# Patient Record
Sex: FEMALE | Race: WHITE
[De-identification: names, ages, dates, MRNs, and addresses within clinical notes are randomized per-mention and may not be internally consistent; named-entity substitution may affect disease eponyms.]

---

## 2017-07-11 ENCOUNTER — APPOINTMENT (OUTPATIENT)
Dept: DERMATOLOGY | Facility: CLINIC | Age: 67
End: 2017-07-11

## 2017-07-11 PROBLEM — Z00.00 ENCOUNTER FOR PREVENTIVE HEALTH EXAMINATION: Status: ACTIVE | Noted: 2017-07-11

## 2018-07-11 ENCOUNTER — APPOINTMENT (OUTPATIENT)
Dept: DERMATOLOGY | Facility: CLINIC | Age: 68
End: 2018-07-11

## 2020-11-05 ENCOUNTER — APPOINTMENT (OUTPATIENT)
Dept: ORTHOPEDIC SURGERY | Facility: CLINIC | Age: 70
End: 2020-11-05
Payer: MEDICARE

## 2020-11-05 VITALS
HEIGHT: 67 IN | DIASTOLIC BLOOD PRESSURE: 83 MMHG | WEIGHT: 157 LBS | HEART RATE: 66 BPM | SYSTOLIC BLOOD PRESSURE: 149 MMHG | BODY MASS INDEX: 24.64 KG/M2

## 2020-11-05 DIAGNOSIS — Z78.9 OTHER SPECIFIED HEALTH STATUS: ICD-10-CM

## 2020-11-05 DIAGNOSIS — Z86.39 PERSONAL HISTORY OF OTHER ENDOCRINE, NUTRITIONAL AND METABOLIC DISEASE: ICD-10-CM

## 2020-11-05 DIAGNOSIS — Z87.39 PERSONAL HISTORY OF OTHER DISEASES OF THE MUSCULOSKELETAL SYSTEM AND CONNECTIVE TISSUE: ICD-10-CM

## 2020-11-05 DIAGNOSIS — M16.12 UNILATERAL PRIMARY OSTEOARTHRITIS, LEFT HIP: ICD-10-CM

## 2020-11-05 DIAGNOSIS — M25.551 PAIN IN RIGHT HIP: ICD-10-CM

## 2020-11-05 PROCEDURE — 99203 OFFICE O/P NEW LOW 30 MIN: CPT

## 2020-11-05 PROCEDURE — 73502 X-RAY EXAM HIP UNI 2-3 VIEWS: CPT | Mod: LT

## 2020-11-05 NOTE — HISTORY OF PRESENT ILLNESS
[de-identified] : Ms. JOANNA RITTER is a 70 year old female presenting for evaluation of intermittent left hip pain.  Patient localizes the pain to the left groin but states it is worse on days of increased physical activity.  She denies consistent pain.  She states the pain is exacerbated by excessive repetitions and home exercise.  She denies any falls or trauma.  Patient denies any stiffness of the hip.  She has not had any injections or physical therapy to date.  Patient will take Tylenol and NSAIDs as needed.  Presents today to make sure she does not need hip replacement as her sister needed one in the past.

## 2020-11-05 NOTE — CONSULT LETTER
[Dear  ___] : Dear  [unfilled], [Consult Letter:] : I had the pleasure of evaluating your patient, [unfilled]. [Please see my note below.] : Please see my note below. [Consult Closing:] : Thank you very much for allowing me to participate in the care of this patient.  If you have any questions, please do not hesitate to contact me. [Sincerely,] : Sincerely, [FreeTextEntry2] : YAW BENITEZ MD\par  [FreeTextEntry3] : Octavio Koehler MD\par Chief of Joint Replacement\par [facility] \par [facility]\par \par

## 2020-11-05 NOTE — PHYSICAL EXAM
[de-identified] : The patient appears well nourished  and in no apparent distress.  The patient is alert and oriented to person, place, and time.   Affect and mood appear normal. The head is normocephalic and atraumatic.  The eyes reveal normal sclera and extra ocular muscles are intact. The tongue is midline with no apparent lesions.  Skin shows normal turgor with no evidence of eczema or psoriasis.  No respiratory distress noted.  Sensation grossly intact.\par   [de-identified] : Exam of the right hip shows hip external rotation of 30 degrees, internal rotation of 15 degrees.\par Exam of the left hip shows hip external rotation of 30 degrees, internal rotation of 15 degrees. 5/5 motor strength bilaterally distally. Sensation intact distally.  [de-identified] : Xray- AP pelvis and 2 views left hip shows severe arthritis of the left hip. There is moderate arthritis of the right hip on the AP pelvis view.

## 2020-11-05 NOTE — ADDENDUM
[FreeTextEntry1] : This note was authored by Jorge Montero working as a medical scribe for Dr. Octavio Koehler. The note was reviewed, edited, and revised by Dr. Octavio Koehler whom is in agreement with the exam findings, imaging findings, and treatment plan. 11/05/2020.

## 2020-11-05 NOTE — DISCUSSION/SUMMARY
[de-identified] : The patient is a 70 year old female with severe arthritis of the left hip. Conservative options were discussed. She is having minimal discomfort at this time. We discussed the use of over-the-counter anti-inflammatories as well as activity modification and ice as needed.  She presented today to establish care here and to determine the extent of arthritis in her hips.  She has a family history of arthritis and a relative who underwent hip replacements.  In the future she may benefit from surgery however not until symptoms are more persistent and she would follow-up with us when they become more persistent.

## 2022-01-27 ENCOUNTER — APPOINTMENT (OUTPATIENT)
Dept: ORTHOPEDIC SURGERY | Facility: CLINIC | Age: 72
End: 2022-01-27
Payer: MEDICARE

## 2022-01-27 VITALS
HEART RATE: 68 BPM | WEIGHT: 157 LBS | HEIGHT: 67 IN | BODY MASS INDEX: 24.64 KG/M2 | DIASTOLIC BLOOD PRESSURE: 90 MMHG | SYSTOLIC BLOOD PRESSURE: 158 MMHG

## 2022-01-27 DIAGNOSIS — M16.11 UNILATERAL PRIMARY OSTEOARTHRITIS, RIGHT HIP: ICD-10-CM

## 2022-01-27 PROCEDURE — 73502 X-RAY EXAM HIP UNI 2-3 VIEWS: CPT | Mod: RT

## 2022-01-27 PROCEDURE — 99214 OFFICE O/P EST MOD 30 MIN: CPT

## 2022-01-27 NOTE — DISCUSSION/SUMMARY
[de-identified] : The patient is a 70 year old female with moderate osteoarthritis of the right hip. Conservative options were discussed. She is having minimal discomfort at this time. We discussed the use of NSAIDs as well as activity modification. She will ice and elevate as needed. She is not interested in surgery or injections at this time. She denies need for PT as she is very active. Patient will follow up as needed.

## 2022-01-27 NOTE — HISTORY OF PRESENT ILLNESS
[de-identified] : Ms. JOANNA RITTER is a 71 year old female presenting for evaluation of intermittent left hip pain.  Patient localizes the pain to the left groin but states it is worse on days of increased physical activity.  She denies consistent pain and states when she does have pain it is minimal. Patient denies any stiffness of the hip and reports no difficulty with ADLs including putting on socks and shoes.  She has not had any injections or physical therapy to date.  Patient states she goes to the gym every day. Patient will take Tylenol and celebrex as needed.  Presents today to monitor for progression of osteoarthritis.

## 2022-01-27 NOTE — PHYSICAL EXAM
[de-identified] : The patient appears well nourished  and in no apparent distress.  The patient is alert and oriented to person, place, and time.   Affect and mood appear normal. The head is normocephalic and atraumatic.  The eyes reveal normal sclera and extra ocular muscles are intact. The tongue is midline with no apparent lesions.  Skin shows normal turgor with no evidence of eczema or psoriasis.  No respiratory distress noted.  Sensation grossly intact.\par   [de-identified] : Exam of the right hip: skin within normal limits. SLR is smooth and intact. ROM: Flexion to 100,  external rotation of 40 degrees, internal rotation of 20 degrees.\par 5/5 motor strength bilaterally distally. Sensation intact distally.  [de-identified] : Xray- AP pelvis and 2 views right hip shows moderate arthritis of the right hip.